# Patient Record
Sex: MALE | Race: WHITE | Employment: FULL TIME | URBAN - NONMETROPOLITAN AREA
[De-identification: names, ages, dates, MRNs, and addresses within clinical notes are randomized per-mention and may not be internally consistent; named-entity substitution may affect disease eponyms.]

---

## 2017-12-31 ENCOUNTER — HISTORY (OUTPATIENT)
Dept: FAMILY MEDICINE | Facility: OTHER | Age: 57
End: 2017-12-31

## 2017-12-31 ENCOUNTER — OFFICE VISIT - GICH (OUTPATIENT)
Dept: FAMILY MEDICINE | Facility: OTHER | Age: 57
End: 2017-12-31

## 2017-12-31 DIAGNOSIS — B97.89 OTHER VIRAL AGENTS AS THE CAUSE OF DISEASES CLASSIFIED ELSEWHERE: ICD-10-CM

## 2017-12-31 DIAGNOSIS — B30.9 VIRAL CONJUNCTIVITIS: ICD-10-CM

## 2017-12-31 DIAGNOSIS — J06.9 ACUTE UPPER RESPIRATORY INFECTION: ICD-10-CM

## 2018-02-09 VITALS
TEMPERATURE: 97.9 F | DIASTOLIC BLOOD PRESSURE: 96 MMHG | WEIGHT: 259.38 LBS | BODY MASS INDEX: 34.38 KG/M2 | SYSTOLIC BLOOD PRESSURE: 150 MMHG | HEIGHT: 73 IN | HEART RATE: 64 BPM

## 2018-02-12 NOTE — NURSING NOTE
Patient Information     Patient Name MRN Gonzalo Torres [id number] Male 1960      Nursing Note by Nora Montgomery at 2017 11:30 AM     Author:  Nora Montgomery Service:  (none) Author Type:  (none)     Filed:  2017 12:07 PM Encounter Date:  2017 Status:  Signed     :  Nora Montgomery            Patient presents to the clinic for right eye redness and swelling that started yesterday morning. Patient reports the eye itching and being irritated. He has used leftover erythromycin ointment on the eye last night and this AM and states it felt better.  Nora AYALA CMA..AAMA.....2017..11:42 AM

## 2018-02-12 NOTE — PATIENT INSTRUCTIONS
Patient Information     Patient Name MRN Gonzalo Torres [id number] Male 1960      Patient Instructions by Zenaida Gabriel NP at 2017 11:30 AM     Author:  Zenaida Gabriel NP Service:  (none) Author Type:  PHYS- Nurse Practitioner     Filed:  2017 12:08 PM Encounter Date:  2017 Status:  Signed     :  Zenaida Gabriel NP (PHYS- Nurse Practitioner)            Eye Infection, Viral   What is a viral eye infection?   A viral eye infection is caused by a virus. This condition is also called pink eye or viral conjunctivitis.  Your child may have:    Redness of the white part of the eye (sclera)    Redness of the inner eyelids    Puffy eyelids    A watery eye.  What is the cause?   Red eyes are usually caused by a viral infection and they often occur when a child has a cold. If a bacterial infection occurs, discharge from the eyes becomes yellow and the eyelids are often matted together after sleeping. If this happens, your child needs antibiotic eye drops even if the eyes are not red.  How long does it last?   Viral conjunctivitis usually lasts as long as the cold (1 to 2 weeks). If only one eye is red, the other eye will usually become infected over the next few days.  How can I take care of my eye?     Rinse out with water: Rinse the eyes with warm water as often as possible, at least every 1 or 2 hours while your child is awake. Use a fresh, wet cotton ball each time. This rinsing usually will keep a bacterial infection from occurring.    Eye drops: A viral infection is not helped by antibiotic eye drops, so they are not recommended. Artificial tears eye drops may reduce symptoms.    Contagiousness: Pink eye is harmless and mildly contagious. Children with viral conjunctivitis do not need to miss any day care or school. [place] is not a public health risk and keeping these children home is over-reacting.  When should I call my child's healthcare provider?  Call IMMEDIATELY  if:    The eyelids become very red or swollen.    Your child develops blurred vision or eye pain.  Call within 24 hours if:    A yellow discharge develops.    The redness lasts more than 7 days.    You have other concerns or questions.

## 2020-01-17 NOTE — PROGRESS NOTES
Patient Information     Patient Name MRN Sex Gonzalo Mobley [id number] Male 1960      Progress Notes by Zenaida Gabriel NP at 2017 11:30 AM     Author:  Zenaida Gabriel NP Service:  (none) Author Type:  PHYS- Nurse Practitioner     Filed:  2017 12:28 PM Encounter Date:  2017 Status:  Signed     :  Zenaida Gabriel NP (PHYS- Nurse Practitioner)            Nursing Notes:   Nora Montgomery  2017 12:07 PM  Signed  Patient presents to the clinic for right eye redness and swelling that started yesterday morning. Patient reports the eye itching and being irritated. He has used leftover erythromycin ointment on the eye last night and this AM and states it felt better.  Nora AYALA CMA..AAMA.....2017..11:42 AM    SUBJECTIVE:    Gonzalo Lopez is a 57 y.o. male who presents for eye concerns    Eye Problem    The right eye is affected. This is a new problem. The current episode started yesterday. The problem occurs constantly. The problem has been gradually worsening. There was no injury mechanism. The patient is experiencing no pain. There is known exposure to pink eye. He does not wear contacts. Associated symptoms include an eye discharge, eye redness, itching and a recent URI. Pertinent negatives include no double vision, fever, foreign body sensation, nausea, photophobia or vomiting. Treatments tried: left over eye ointment. The treatment provided no relief.       No current outpatient prescriptions on file prior to visit.     No current facility-administered medications on file prior to visit.        REVIEW OF SYSTEMS:  Review of Systems   Constitutional: Negative for fever.   Eyes: Positive for discharge and redness. Negative for double vision and photophobia.   Gastrointestinal: Negative for nausea and vomiting.   Skin: Positive for itching.       OBJECTIVE:  /96 (Cuff Site: Left Arm, Position: Sitting, Cuff Size: Adult Large)  Pulse 64  Temp 97.9  F (36.6  " C) (Tympanic)  Ht 1.854 m (6' 1\")  Wt 117.7 kg (259 lb 6 oz)  BMI 34.22 kg/m2    EXAM:   Physical Exam   Constitutional: He is well-developed, well-nourished, and in no distress.   HENT:   Head: Normocephalic and atraumatic.   Right Ear: Tympanic membrane and ear canal normal.   Left Ear: Tympanic membrane and ear canal normal.   Nose: Mucosal edema and rhinorrhea present.   Mouth/Throat: Uvula is midline, oropharynx is clear and moist and mucous membranes are normal.   Nasal congestion and runny nose noted.    Eyes: EOM and lids are normal. Pupils are equal, round, and reactive to light. Right conjunctiva is injected. Right conjunctiva has no hemorrhage. Left conjunctiva is not injected. Left conjunctiva has no hemorrhage.   Neck: Neck supple.   Cardiovascular: Normal rate.    Pulmonary/Chest: Effort normal. No respiratory distress.   Skin: Skin is warm and dry. No rash noted.   Psychiatric: Mood and affect normal.   Nursing note and vitals reviewed.      ASSESSMENT/PLAN:    ICD-10-CM    1. Viral conjunctivitis of right eye B30.9    2. Viral upper respiratory tract infection J06.9      B97.89         Plan:  Viral nature discussed. Home cares and OTC discussed. F/U if needed.       TARIK CAIN NP ....................  12/31/2017   12:28 PM            " 10

## 2021-01-01 ENCOUNTER — TELEPHONE (OUTPATIENT)
Dept: FAMILY MEDICINE | Facility: OTHER | Age: 61
End: 2021-01-01

## 2021-01-01 ENCOUNTER — ALLIED HEALTH/NURSE VISIT (OUTPATIENT)
Dept: FAMILY MEDICINE | Facility: OTHER | Age: 61
End: 2021-01-01
Attending: NURSE PRACTITIONER
Payer: COMMERCIAL

## 2021-01-01 ENCOUNTER — ALLIED HEALTH/NURSE VISIT (OUTPATIENT)
Dept: FAMILY MEDICINE | Facility: OTHER | Age: 61
End: 2021-01-01
Attending: FAMILY MEDICINE
Payer: COMMERCIAL

## 2021-01-01 DIAGNOSIS — R50.9 FEVER AND CHILLS: Primary | ICD-10-CM

## 2021-01-01 DIAGNOSIS — Z20.822 ENCOUNTER FOR LABORATORY TESTING FOR COVID-19 VIRUS: ICD-10-CM

## 2021-01-01 DIAGNOSIS — Z20.822 ENCOUNTER FOR LABORATORY TESTING FOR COVID-19 VIRUS: Primary | ICD-10-CM

## 2021-01-01 DIAGNOSIS — Z20.822 EXPOSURE TO COVID-19 VIRUS: ICD-10-CM

## 2021-01-01 DIAGNOSIS — R52 BODY ACHES: ICD-10-CM

## 2021-01-01 LAB
LABORATORY COMMENT REPORT: NORMAL
LABORATORY COMMENT REPORT: NORMAL
SARS-COV-2 RNA RESP QL NAA+PROBE: NEGATIVE
SARS-COV-2 RNA RESP QL NAA+PROBE: NEGATIVE
SARS-COV-2 RNA RESP QL NAA+PROBE: NORMAL
SARS-COV-2 RNA RESP QL NAA+PROBE: NORMAL
SPECIMEN SOURCE: NORMAL

## 2021-01-01 PROCEDURE — U0003 INFECTIOUS AGENT DETECTION BY NUCLEIC ACID (DNA OR RNA); SEVERE ACUTE RESPIRATORY SYNDROME CORONAVIRUS 2 (SARS-COV-2) (CORONAVIRUS DISEASE [COVID-19]), AMPLIFIED PROBE TECHNIQUE, MAKING USE OF HIGH THROUGHPUT TECHNOLOGIES AS DESCRIBED BY CMS-2020-01-R: HCPCS | Mod: ZL | Performed by: FAMILY MEDICINE

## 2021-01-01 PROCEDURE — U0003 INFECTIOUS AGENT DETECTION BY NUCLEIC ACID (DNA OR RNA); SEVERE ACUTE RESPIRATORY SYNDROME CORONAVIRUS 2 (SARS-COV-2) (CORONAVIRUS DISEASE [COVID-19]), AMPLIFIED PROBE TECHNIQUE, MAKING USE OF HIGH THROUGHPUT TECHNOLOGIES AS DESCRIBED BY CMS-2020-01-R: HCPCS | Mod: ZL | Performed by: NURSE PRACTITIONER

## 2021-01-01 PROCEDURE — C9803 HOPD COVID-19 SPEC COLLECT: HCPCS

## 2021-01-01 PROCEDURE — 99212 OFFICE O/P EST SF 10 MIN: CPT | Mod: TEL | Performed by: NURSE PRACTITIONER

## 2021-01-01 PROCEDURE — U0005 INFEC AGEN DETEC AMPLI PROBE: HCPCS | Mod: ZL | Performed by: FAMILY MEDICINE

## 2021-01-01 PROCEDURE — U0005 INFEC AGEN DETEC AMPLI PROBE: HCPCS | Mod: ZL | Performed by: NURSE PRACTITIONER

## 2021-03-14 NOTE — PROGRESS NOTES
Gonzalo is a 60 year old who is being evaluated via a billable telephone visit.      What phone number would you like to be contacted at? [phone]  How would you like to obtain your AVS?none      Subjective   Gonzalo is a 60 year old who presents for the following telephone visit for covid testing:    HPI   States he was with a group of 8 people on 3/5/21.  States 6 of the 8 people have been tested for covid and  5 of the 6 have been positive.  Him and his wife are the only ones not tested yet.  States 2 are really sick.  First person started getting sick on 3/10/21.  He has not been around anyone since the exposure.  Him and his wife are insistent on getting tested.    Suspected COVID exposure: yes   Known COVID exposure: yes   Fevers or chills: No  Nasal congestion or drainage: chronic runny nose in the mornings  Cough: smokers cough in the mornings, no change  Chest tightness or heaviness: No  Shortness of breath: No change   Sore throat: maybe   Nausea or vomiting: No  Diarrhea: mild the past few days   Loss of taste or smell: No  Headaches: No  Body aches: mild the past few days  Fatigue: No  Previous covid test: NO             Objective           Vitals:  No vitals were obtained today due to virtual visit.    Physical Exam   healthy, alert, no distress and cooperative  PSYCH: Alert and oriented times 3; coherent speech, normal   rate and volume, able to articulate logical thoughts, able   to abstract reason, no tangential thoughts, no hallucinations   or delusions  His affect is normal and pleasant  RESP: No cough, no audible wheezing, able to talk in full sentences  Remainder of exam unable to be completed due to telephone visits        1. Encounter for laboratory testing for COVID-19 virus    - Asymptomatic COVID-19 Virus (Coronavirus) by PCR; Future    2. Exposure to COVID-19 virus    - Asymptomatic COVID-19 Virus (Coronavirus) by PCR; Future      Come to clinic for [facility] COVID-19 test, phone number  provided and process discussed.    Self quarantine until test results are available and continue if positive and/or for total of 10-14 days.    Instructed to limit movements outside of home as much as possible, social distance, mask in public spaces, and monitor closely for COVID-19 symptoms      Discussed warning signs/symptoms indicative of need to f/u  Follow up if concerns      Phone call duration: 6  minutes